# Patient Record
Sex: FEMALE | Race: WHITE
[De-identification: names, ages, dates, MRNs, and addresses within clinical notes are randomized per-mention and may not be internally consistent; named-entity substitution may affect disease eponyms.]

---

## 2021-01-06 ENCOUNTER — HOSPITAL ENCOUNTER (OUTPATIENT)
Dept: HOSPITAL 47 - RADMAMWWP | Age: 61
Discharge: HOME | End: 2021-01-06
Attending: FAMILY MEDICINE
Payer: COMMERCIAL

## 2021-01-06 DIAGNOSIS — Z12.31: Primary | ICD-10-CM

## 2021-01-06 DIAGNOSIS — M81.0: ICD-10-CM

## 2021-01-06 PROCEDURE — 77080 DXA BONE DENSITY AXIAL: CPT

## 2021-01-06 PROCEDURE — 77067 SCR MAMMO BI INCL CAD: CPT

## 2021-01-06 NOTE — BD
EXAMINATION TYPE: Axial Bone Density

 

DATE OF EXAM: 1/6/2021

 

COMPARISON: NONE

 

CLINICAL HISTORY: Postmenopausal female

 

Height:  62.2 IN

Weight:  133 LBS

 

 

RISK FACTORS 

HISTORY OF: 

Active: YES

Diet low in dairy products/other sources of calcium:  YES

Postmenopausal woman: AGE 53

 

MEDICATIONS: 

Additional Medications: MULTI VIT, B12, ELDERBERRY,  

 

 

 

EXAM MEASUREMENTS: 

Bone mineral densitometry was performed using the [employer].

Bone mineral density as measured about the Lumbar spine is:  

----- L1-L4(G/cm2): 0.805

T Score Values are as follows:

----- L2: -3.3

----- L3: -3.1

----- L4: -3.1

----- L1-L4: -3.1

Bone mineral density BASELINE

 

Bone mineral density about the R hip (g/cm2): 0.665

Bone mineral density about the L hip (g/cm2): 0.676

T Score values are as follows:

-----R Neck: -2.7

-----L Neck: -2.6

-----R Total: -2.2

-----L Total: -2.0

Bone mineral density BASELINE

 

 

IMPRESSION:

Osteoporosis (T Score less than -2.5).

 

There is increased fracture risk and therapy is usually indicated based on age.

 

Re-Screen 1-2 years.

 

NOTE:  T-SCORE=SD OF THE YOUNG ADULT MEAN.

## 2021-01-25 NOTE — MM
Reason for exam: screening  (asymptomatic).

Last mammogram was performed 3 years and 4 months ago.



History:

Patient is postmenopausal.



Physical Findings:

A clinical breast exam by your physician is recommended on an annual basis and 

results should be correlated with mammographic findings.



MG Screening Mammo w CAD

Bilateral CC and MLO view(s) were taken.

Prior study comparison: September 20, 2017, mammogram, performed at [place].  July 13, 2016, mammogram, performed at [place].

The breast tissue is heterogeneously dense. This may lower the sensitivity of 

mammography.  No significant changes when compared with prior studies.





ASSESSMENT: Benign, BI-RAD 2



RECOMMENDATION:

Routine screening mammogram of both breasts in 1 year.